# Patient Record
Sex: MALE | Race: WHITE
[De-identification: names, ages, dates, MRNs, and addresses within clinical notes are randomized per-mention and may not be internally consistent; named-entity substitution may affect disease eponyms.]

---

## 2020-08-13 ENCOUNTER — HOSPITAL ENCOUNTER (EMERGENCY)
Dept: HOSPITAL 43 - DL.ED | Age: 40
Discharge: HOME | End: 2020-08-13
Payer: COMMERCIAL

## 2020-08-13 DIAGNOSIS — U07.1: Primary | ICD-10-CM

## 2020-08-13 DIAGNOSIS — J12.89: ICD-10-CM

## 2020-08-13 LAB
ANION GAP SERPL CALC-SCNC: 14.5 MEQ/L (ref 7–13)
CHLORIDE SERPL-SCNC: 97 MMOL/L (ref 98–107)
SODIUM SERPL-SCNC: 134 MMOL/L (ref 136–145)

## 2020-08-13 NOTE — CT
PROCEDURE INFORMATION: 

Exam: CT Chest Without Contrast 

Exam date and time: 8/13/2020 8:13 PM 

Age: 39 years old 

Clinical indication: Cough and shortness of breath; Additional info: Covid + 

with worsening symptoms 



TECHNIQUE: 

Imaging protocol: Computed tomography of the chest without contrast. 

Radiation optimization: All CT scans at this facility use at least one of these 

dose optimization techniques: automated exposure control; mA and/or kV 

adjustment per patient size (includes targeted exams where dose is matched to 

clinical indication); or iterative reconstruction. 



COMPARISON: 

No relevant prior studies available. 



FINDINGS: 

Lungs: Multifocal peripheral rounded ground-glass regions are seen in both 

lungs, classic for COVID-19 pneumonia. No consolidation. No suspicious mass. 

Pleural space: No pneumothorax, pleural effusion, pleural plaque, mass, or 

calcification. 

Heart: Normal heart size. No pericardial effusion. 

Aorta: No aortic aneurysm. 

Lymph nodes: No enlarged axillary, mediastinal, or hilar lymph nodes. 



Liver: There is mild fatty liver replacement. 

Bones/joints: Age appropriate. No acute fracture. No suspicious lytic or 

osteosclerotic lesions. 

Soft tissues: Unremarkable. 



IMPRESSION: 

Multifocal peripheral rounded ground-glass regions are seen in both lungs, 

classic for COVID-19 pneumonia.

## 2020-08-13 NOTE — EDM.PDOC
ED HPI GENERAL MEDICAL PROBLEM





- General


Chief Complaint: Respiratory Problem


Stated Complaint: POSITIVE COVID, SOB, WHEEZING PAIN


Time Seen by Provider: 08/13/20 19:10


Source of Information: Reports: Patient, RN, RN Notes Reviewed


History Limitations: Reports: No Limitations





- History of Present Illness


INITIAL COMMENTS - FREE TEXT/NARRATIVE: 


Presents to ER with complaint of worsening symptoms of COVID-19.  Patient states

he began having symptoms of COVID last Wednesday, August 5, was tested on August 8, and was called and told he was positive for COVID on Monday, August 10.  

Patient states he has had a persistent fever, loss of taste and smell, and 

states he feels the cough is getting worse as he has pain in the chest with 

cough and deep breathing.  Patient denies nausea, vomiting, diarrhea, abdominal 

pain.  Patient states his wife is also positive, 2 children are negative in the 

home.


Onset: Gradual





- Related Data


                                    Allergies











Allergy/AdvReac Type Severity Reaction Status Date / Time


 


No Known Allergies Allergy   Verified 08/13/20 18:04











Home Meds: 


                                    Home Meds





. [No Known Home Meds]  08/13/20 [History]











Past Medical History





- Past Health History


Medical/Surgical History: Denies Medical/Surgical History





Social & Family History





- Tobacco Use


Smoking Status *Q: Never Smoker





- Caffeine Use


Caffeine Use: Reports: Coffee





- Recreational Drug Use


Recreational Drug Use: No





ED ROS GENERAL





- Review of Systems


Review Of Systems: Comprehensive ROS is negative, except as noted in HPI.





ED EXAM, GENERAL





- Physical Exam


Exam: See Below


Exam Limited By: No Limitations


General Appearance: Alert, WD/WN, No Apparent Distress


Eye Exam: Bilateral Eye: EOMI, Normal Inspection


Ears: Normal External Exam, Hearing Grossly Normal


Nose: Normal Inspection


Throat/Mouth: Normal Inspection, Normal Lips, Normal Teeth, Normal Gums, Normal 

Oropharynx, Normal Voice, No Airway Compromise


Head: Atraumatic, Normocephalic


Neck: Normal Inspection, Supple, Non-Tender, Full Range of Motion


Respiratory/Chest: No Respiratory Distress, Lungs Clear, Normal Breath Sounds, 

No Accessory Muscle Use, Chest Non-Tender, Decreased Breath Sounds


Cardiovascular: Normal Peripheral Pulses, Regular Rate, Rhythm, No Edema, No 

Gallop, No JVD, No Murmur, No Rub


Peripheral Pulses: 2+: Radial (L), Radial (R)


GI/Abdominal: Normal Bowel Sounds, Soft, Non-Tender


 (Male) Exam: Deferred


Rectal (Males) Exam: Deferred


Back Exam: Normal Inspection, Full Range of Motion, NT


Extremities: Normal Inspection, Normal Range of Motion, Non-Tender, Normal 

Capillary Refill, No Pedal Edema


Neurological: Alert, Oriented, CN II-XII Intact, Normal Cognition, Normal Gait, 

Normal Reflexes, No Motor/Sensory Deficits


Psychiatric: Normal Affect, Normal Mood


Skin Exam: Warm, Dry, Intact, Normal Color, No Rash


Lymphatic: No Adenopathy





Course





- Vital Signs


Last Recorded V/S: 


                                Last Vital Signs











Temp  98.7 F   08/13/20 20:34


 


Pulse  81   08/13/20 20:34


 


Resp  18   08/13/20 20:34


 


BP  123/73   08/13/20 20:34


 


Pulse Ox  93 L  08/13/20 20:34














- Orders/Labs/Meds


Orders: 


                               Active Orders 24 hr











 Category Date Time Status


 


 CULTURE BLOOD [BC] Stat Lab  08/13/20 18:45 Received


 


 D-DIMER QUANTITATIVE [COAG] Stat Lab  08/13/20 18:45 Received











Labs: 


                                Laboratory Tests











  08/13/20 08/13/20 08/13/20 Range/Units





  18:45 18:45 18:45 


 


WBC  4.6 L    (5.0-10.0)  10^3/uL


 


RBC  5.49    (4.6-6.2)  10^6/uL


 


Hgb  15.9    (14.0-18.0)  g/dL


 


Hct  46.1    (40.0-54.0)  %


 


MCV  84.0    ()  fL


 


MCH  29.0    (27.0-34.0)  pg


 


MCHC  34.5    (33.0-35.0)  g/dL


 


Plt Count  179    (150-450)  10^3/uL


 


Neut % (Auto)  67.6    (42.2-75.2)  %


 


Lymph % (Auto)  24.8    (20.5-50.1)  %


 


Mono % (Auto)  7.2    (2-8)  %


 


Eos % (Auto)  0.2 L    (1.0-3.0)  %


 


Baso % (Auto)  0.2    (0.0-1.0)  %


 


Sodium   134 L   (136-145)  mmol/L


 


Potassium   3.5   (3.5-5.1)  mmol/L


 


Chloride   97 L   ()  mmol/L


 


Carbon Dioxide   26   (21-32)  mmol/L


 


Anion Gap   14.5 H   (7-13)  mEq/L


 


BUN   12   (7-18)  mg/dL


 


Creatinine   1.07   (0.70-1.30)  mg/dL


 


Est Cr Clr Drug Dosing   104.75   mL/min


 


Estimated GFR (MDRD)   > 60   


 


BUN/Creatinine Ratio   11.2   (No establ ref range)  


 


Glucose   108 H   (74-99)  mg/dL


 


Lactic Acid    1.1  (0.4-2.0)  mmol/L


 


Calcium   8.7   (8.5-10.1)  mg/dL


 


Total Bilirubin   0.4   (0.2-1.0)  mg/dL


 


AST   32   (15-37)  U/L


 


ALT   29   (16-63)  U/L


 


Alkaline Phosphatase   73   ()  U/L


 


C-Reactive Protein   2.5 H   (0.0-0.9)  mg/dL


 


Total Protein   7.8   (6.4-8.2)  g/dL


 


Albumin   3.5   (3.4-5.0)  g/dL


 


Globulin   4.3   


 


Albumin/Globulin Ratio   0.8   











Meds: 


Medications














Discontinued Medications














Generic Name Dose Route Start Last Admin





  Trade Name Freq  PRN Reason Stop Dose Admin


 


Albuterol  Confirm  08/13/20 21:11  08/13/20 21:39





  Proventil Hfa  Administered  08/13/20 21:12  Not Given





  Dose  





  6.7 gm  





  [facility]  





  .STK-MED ONE  


 


Guaifenesin/Codeine Phosphate  Confirm  08/13/20 21:11  08/13/20 21:39





  Robitussin Ac  Administered  08/13/20 21:12  Not Given





  Dose  





  20 ml  





  .ROUTE  





  .STK-MED ONE  














- Radiology Interpretation


Free Text/Narrative:: 


CT wo contrast:


PROCEDURE INFORMATION:


Exam: CT Chest Without Contrast


Exam date and time: 8/13/2020 8:13 PM


Age: 39 years old


Clinical indication: Cough and shortness of breath; Additional info: Covid + 

with worsening


symptoms


TECHNIQUE:


Imaging protocol: Computed tomography of the chest without contrast.


Radiation optimization: All CT scans at this facility use at least one of these 

dose optimization


techniques: automated exposure control; mA and/or kV adjustment per patient size

 (includes targeted


exams where dose is matched to clinical indication); or iterative 

reconstruction.


COMPARISON:


No relevant prior studies available.


FINDINGS:


Lungs: Multifocal peripheral rounded ground-glass regions are seen in both 

lungs, classic for


COVID-19 pneumonia. No consolidation. No suspicious mass.


Pleural space: No pneumothorax, pleural effusion, pleural plaque, mass, or 

calcification.


Heart: Normal heart size. No pericardial effusion.


Aorta: No aortic aneurysm.


Lymph nodes: No enlarged axillary, mediastinal, or hilar lymph nodes.


Liver: There is mild fatty liver replacement.


Bones/joints: Age appropriate. No acute fracture. No suspicious lytic or 

osteosclerotic lesions.


Soft tissues: Unremarkable.


IMPRESSION:


Multifocal peripheral rounded ground-glass regions are seen in both lungs, 

classic for COVID-19


pneumonia.


Thank you for allowing us to participate in the care of your patient.


Dictated and Authenticated by: Jorden Benitez MD


08/13/2020 8:38 PM Central Time ([place] & [place])








See rad report








Departure





- Departure


Time of Disposition: 20:59


Disposition: Home, Self-Care 01


Condition: Fair


Clinical Impression: 


 COVID-19





Pneumonia


Qualifiers:


 Pneumonia type: due to unspecified organism Laterality: bilateral Lung 

location: lower lobe of lung Qualified Code(s): J18.9 - Pneumonia, unspecified 

organism








- Discharge Information


*PRESCRIPTION DRUG MONITORING PROGRAM REVIEWED*: No


*COPY OF PRESCRIPTION DRUG MONITORING REPORT IN PATIENT NAA: No


Instructions:  COVID-19 Frequently Asked Questions, COVID-19: How to Protect 

Yourself and Others - CDC, Prevent the Spread of COVID-19 if You Are Sick - CDC


Forms:  ED Department Discharge


Additional Instructions: 


Rx: Cheratussin AC, albuterol inhaler


Return to ER with any worsening of symptoms


Follow-up with your primary care provider





Sepsis Event Note (ED)





- Evaluation


Sepsis Screening Result: Possible Sepsis Risk





- Focused Exam


Vital Signs: 


                                   Vital Signs











  Temp Temp Pulse Resp BP Pulse Ox


 


 08/13/20 20:34  98.7 F   81  18  123/73  93 L


 


 08/13/20 18:00   98.7 F  108 H  18  101/67  97

## 2022-05-06 ENCOUNTER — HOSPITAL ENCOUNTER (OUTPATIENT)
Dept: HOSPITAL 43 - DL.ENDO | Age: 42
Discharge: HOME | End: 2022-05-06
Attending: INTERNAL MEDICINE
Payer: COMMERCIAL

## 2022-05-06 DIAGNOSIS — Z01.812: ICD-10-CM

## 2022-05-06 DIAGNOSIS — E66.09: ICD-10-CM

## 2022-05-06 DIAGNOSIS — Z20.822: ICD-10-CM

## 2022-05-06 DIAGNOSIS — Z86.16: ICD-10-CM

## 2022-05-06 DIAGNOSIS — K31.89: ICD-10-CM

## 2022-05-06 DIAGNOSIS — Z98.890: ICD-10-CM

## 2022-05-06 DIAGNOSIS — D50.9: ICD-10-CM

## 2022-05-06 DIAGNOSIS — K29.50: Primary | ICD-10-CM

## 2022-05-06 PROCEDURE — U0002 COVID-19 LAB TEST NON-CDC: HCPCS

## 2022-05-11 ENCOUNTER — HOSPITAL ENCOUNTER (OUTPATIENT)
Dept: HOSPITAL 43 - DL.ENDO | Age: 42
Discharge: HOME | End: 2022-05-11
Attending: INTERNAL MEDICINE
Payer: COMMERCIAL

## 2022-05-11 DIAGNOSIS — E66.09: ICD-10-CM

## 2022-05-11 DIAGNOSIS — K64.8: ICD-10-CM

## 2022-05-11 DIAGNOSIS — D50.9: ICD-10-CM

## 2022-05-11 DIAGNOSIS — Z98.890: ICD-10-CM

## 2022-05-11 DIAGNOSIS — Z86.16: ICD-10-CM

## 2022-05-11 DIAGNOSIS — K57.30: Primary | ICD-10-CM
